# Patient Record
(demographics unavailable — no encounter records)

---

## 2024-07-24 NOTE — TELEPHONE ENCOUNTER
Refill Routing Note   Medication(s) are not appropriate for processing by [facility] for the following reason(s):        Outside of protocol  Responsible provider unclear  Patient not seen by provider within 15 months    [facility] action(s):  Route               Appointments  past 12m or future 3m with PCP    Date Provider   Last Visit   Visit date not found Clyde Fritz MD   Next Visit   Visit date not found Clyde Fritz MD   ED visits in past 90 days: 0        Note composed:8:26 AM 07/24/2024

## 2024-07-25 RX ORDER — ALENDRONATE SODIUM 70 MG/1
TABLET ORAL
Qty: 12 TABLET | Refills: 3 | OUTPATIENT
Start: 2024-07-25

## 2024-12-11 RX ORDER — ALENDRONATE SODIUM 70 MG/1
TABLET ORAL
Qty: 12 TABLET | Refills: 3 | OUTPATIENT
Start: 2024-12-11

## 2024-12-11 NOTE — TELEPHONE ENCOUNTER
I have attempted to contact this patient by phone with the following results: no answer, left message to return my call on answering machine.    
I have attempted to contact this patient by phone with the following results: no answer, left message to return my call on answering machine.    
Refill Routing Note   Medication(s) are not appropriate for processing by [facility] for the following reason(s):        Outside of protocol  Responsible provider unclear    [facility] action(s):  Route               Appointments  past 12m or future 3m with PCP    Date Provider   Last Visit   Visit date not found Clyde Fritz MD   Next Visit   Visit date not found Clyde Fritz MD   ED visits in past 90 days: 0        Note composed:2:07 AM 12/10/2024          
Patent